# Patient Record
Sex: FEMALE | Race: WHITE
[De-identification: names, ages, dates, MRNs, and addresses within clinical notes are randomized per-mention and may not be internally consistent; named-entity substitution may affect disease eponyms.]

---

## 2020-12-21 ENCOUNTER — HOSPITAL ENCOUNTER (OUTPATIENT)
Dept: HOSPITAL 39 - RAD | Age: 65
End: 2020-12-21
Attending: ORTHOPAEDIC SURGERY
Payer: COMMERCIAL

## 2020-12-21 DIAGNOSIS — M25.559: ICD-10-CM

## 2020-12-21 DIAGNOSIS — M17.11: Primary | ICD-10-CM

## 2020-12-21 NOTE — RAD
EXAM DESCRIPTION: 



Pelvis



CLINICAL HISTORY: 



HIP PAIN



COMPARISON: 



None.



TECHNIQUE: 



AP pelvis



FINDINGS: 



I see no bone joint or soft tissue abnormality. No fracturing is

detected.



IMPRESSION: 



Normal pelvis.



Electronically signed by:  Collins Wilkes MD  12/21/2020 10:24 AM

[facility] Workstation: [id number]

## 2020-12-21 NOTE — RAD
EXAM DESCRIPTION: 



Knee,Right Complete



CLINICAL HISTORY: 



KNEE PAIN



COMPARISON: 



None.



TECHNIQUE: 



4 views right



FINDINGS: 



Loss of medial joint space is observed. Medial tibial osteophyte

formation is observed. A small joint effusion is evident. Mild

patellofemoral joint arthritis is seen. No fracturing is

detected.



IMPRESSION: 



Degenerative changes are observed most pronounced in the medial

joint compartment.



Electronically signed by:  Collins Wilkes MD  12/21/2020 10:25 AM

[facility] Workstation: [id number]